# Patient Record
Sex: FEMALE | URBAN - METROPOLITAN AREA
[De-identification: names, ages, dates, MRNs, and addresses within clinical notes are randomized per-mention and may not be internally consistent; named-entity substitution may affect disease eponyms.]

---

## 2020-07-16 ENCOUNTER — LAB REQUISITION (OUTPATIENT)
Dept: LAB | Facility: HOSPITAL | Age: 35
End: 2020-07-16
Payer: COMMERCIAL

## 2020-07-16 DIAGNOSIS — Z01.419 ENCOUNTER FOR GYNECOLOGICAL EXAMINATION (GENERAL) (ROUTINE) WITHOUT ABNORMAL FINDINGS: ICD-10-CM

## 2020-07-16 DIAGNOSIS — Z11.51 ENCOUNTER FOR SCREENING FOR HUMAN PAPILLOMAVIRUS (HPV): ICD-10-CM

## 2020-07-16 PROCEDURE — 88175 CYTOPATH C/V AUTO FLUID REDO: CPT | Performed by: OBSTETRICS & GYNECOLOGY

## 2020-07-16 PROCEDURE — 87624 HPV HI-RISK TYP POOLED RSLT: CPT | Performed by: OBSTETRICS & GYNECOLOGY

## 2020-07-17 LAB — HPV I/H RISK 1 DNA SPEC QL NAA+PROBE: NEGATIVE

## 2025-01-10 ENCOUNTER — APPOINTMENT (OUTPATIENT)
Dept: GENERAL RADIOLOGY | Facility: HOSPITAL | Age: 40
End: 2025-01-10
Attending: EMERGENCY MEDICINE
Payer: COMMERCIAL

## 2025-01-10 ENCOUNTER — HOSPITAL ENCOUNTER (EMERGENCY)
Facility: HOSPITAL | Age: 40
Discharge: HOME | End: 2025-01-10
Attending: EMERGENCY MEDICINE
Payer: COMMERCIAL

## 2025-01-10 VITALS
HEIGHT: 62 IN | WEIGHT: 160 LBS | SYSTOLIC BLOOD PRESSURE: 157 MMHG | RESPIRATION RATE: 14 BRPM | DIASTOLIC BLOOD PRESSURE: 103 MMHG | OXYGEN SATURATION: 100 % | TEMPERATURE: 97 F | BODY MASS INDEX: 29.44 KG/M2 | HEART RATE: 98 BPM

## 2025-01-10 DIAGNOSIS — R00.2 PALPITATIONS: Primary | ICD-10-CM

## 2025-01-10 DIAGNOSIS — R03.0 ELEVATED BLOOD PRESSURE READING: ICD-10-CM

## 2025-01-10 LAB
ANION GAP SERPL CALC-SCNC: 11 MMOL/L (ref 0–18)
ATRIAL RATE: 118 BPM
ATRIAL RATE: 94 BPM
BASOPHILS # BLD AUTO: 0.03 X10(3) UL (ref 0–0.2)
BASOPHILS NFR BLD AUTO: 0.3 %
BUN BLD-MCNC: 21 MG/DL (ref 9–23)
BUN/CREAT SERPL: 23.6 (ref 10–20)
CALCIUM BLD-MCNC: 10 MG/DL (ref 8.7–10.4)
CHLORIDE SERPL-SCNC: 105 MMOL/L (ref 98–112)
CO2 SERPL-SCNC: 26 MMOL/L (ref 21–32)
CREAT BLD-MCNC: 0.89 MG/DL
D DIMER PPP FEU-MCNC: <0.27 UG/ML FEU (ref ?–0.5)
DEPRECATED RDW RBC AUTO: 40.9 FL (ref 35.1–46.3)
EGFRCR SERPLBLD CKD-EPI 2021: 84 ML/MIN/1.73M2 (ref 60–?)
EOSINOPHIL # BLD AUTO: 0.07 X10(3) UL (ref 0–0.7)
EOSINOPHIL NFR BLD AUTO: 0.8 %
ERYTHROCYTE [DISTWIDTH] IN BLOOD BY AUTOMATED COUNT: 11.9 % (ref 11–15)
GLUCOSE BLD-MCNC: 115 MG/DL (ref 70–99)
HCG SERPL QL: NEGATIVE
HCT VFR BLD AUTO: 43.2 %
HGB BLD-MCNC: 14.4 G/DL
IMM GRANULOCYTES # BLD AUTO: 0.01 X10(3) UL (ref 0–1)
IMM GRANULOCYTES NFR BLD: 0.1 %
LYMPHOCYTES # BLD AUTO: 1.79 X10(3) UL (ref 1–4)
LYMPHOCYTES NFR BLD AUTO: 20.2 %
MAGNESIUM SERPL-MCNC: 1.9 MG/DL (ref 1.6–2.6)
MCH RBC QN AUTO: 30.9 PG (ref 26–34)
MCHC RBC AUTO-ENTMCNC: 33.3 G/DL (ref 31–37)
MCV RBC AUTO: 92.7 FL
MONOCYTES # BLD AUTO: 0.38 X10(3) UL (ref 0.1–1)
MONOCYTES NFR BLD AUTO: 4.3 %
NEUTROPHILS # BLD AUTO: 6.58 X10 (3) UL (ref 1.5–7.7)
NEUTROPHILS # BLD AUTO: 6.58 X10(3) UL (ref 1.5–7.7)
NEUTROPHILS NFR BLD AUTO: 74.3 %
NT-PROBNP SERPL-MCNC: <35 PG/ML (ref ?–125)
OSMOLALITY SERPL CALC.SUM OF ELEC: 298 MOSM/KG (ref 275–295)
P AXIS: 49 DEGREES
P AXIS: 53 DEGREES
P-R INTERVAL: 172 MS
P-R INTERVAL: 172 MS
PLATELET # BLD AUTO: 337 10(3)UL (ref 150–450)
POTASSIUM SERPL-SCNC: 3.6 MMOL/L (ref 3.5–5.1)
Q-T INTERVAL: 326 MS
Q-T INTERVAL: 382 MS
QRS DURATION: 104 MS
QRS DURATION: 98 MS
QTC CALCULATION (BEZET): 456 MS
QTC CALCULATION (BEZET): 477 MS
R AXIS: -15 DEGREES
R AXIS: -19 DEGREES
RBC # BLD AUTO: 4.66 X10(6)UL
SODIUM SERPL-SCNC: 142 MMOL/L (ref 136–145)
T AXIS: 26 DEGREES
T AXIS: 29 DEGREES
T3FREE SERPL-MCNC: 3.34 PG/ML (ref 2.4–4.2)
T4 FREE SERPL-MCNC: 1.3 NG/DL (ref 0.8–1.7)
TROPONIN I SERPL HS-MCNC: <3 NG/L
TSI SER-ACNC: 0.34 UIU/ML (ref 0.55–4.78)
VENTRICULAR RATE: 118 BPM
VENTRICULAR RATE: 94 BPM
WBC # BLD AUTO: 8.9 X10(3) UL (ref 4–11)

## 2025-01-10 PROCEDURE — 84481 FREE ASSAY (FT-3): CPT | Performed by: EMERGENCY MEDICINE

## 2025-01-10 PROCEDURE — 99285 EMERGENCY DEPT VISIT HI MDM: CPT

## 2025-01-10 PROCEDURE — 83880 ASSAY OF NATRIURETIC PEPTIDE: CPT | Performed by: EMERGENCY MEDICINE

## 2025-01-10 PROCEDURE — 85379 FIBRIN DEGRADATION QUANT: CPT | Performed by: EMERGENCY MEDICINE

## 2025-01-10 PROCEDURE — 93010 ELECTROCARDIOGRAM REPORT: CPT

## 2025-01-10 PROCEDURE — 96361 HYDRATE IV INFUSION ADD-ON: CPT

## 2025-01-10 PROCEDURE — 84439 ASSAY OF FREE THYROXINE: CPT | Performed by: EMERGENCY MEDICINE

## 2025-01-10 PROCEDURE — 85025 COMPLETE CBC W/AUTO DIFF WBC: CPT | Performed by: EMERGENCY MEDICINE

## 2025-01-10 PROCEDURE — 84443 ASSAY THYROID STIM HORMONE: CPT | Performed by: EMERGENCY MEDICINE

## 2025-01-10 PROCEDURE — 71045 X-RAY EXAM CHEST 1 VIEW: CPT | Performed by: EMERGENCY MEDICINE

## 2025-01-10 PROCEDURE — 96360 HYDRATION IV INFUSION INIT: CPT

## 2025-01-10 PROCEDURE — 84703 CHORIONIC GONADOTROPIN ASSAY: CPT | Performed by: EMERGENCY MEDICINE

## 2025-01-10 PROCEDURE — 93005 ELECTROCARDIOGRAM TRACING: CPT

## 2025-01-10 PROCEDURE — 84484 ASSAY OF TROPONIN QUANT: CPT | Performed by: EMERGENCY MEDICINE

## 2025-01-10 PROCEDURE — 83735 ASSAY OF MAGNESIUM: CPT | Performed by: EMERGENCY MEDICINE

## 2025-01-10 PROCEDURE — 80048 BASIC METABOLIC PNL TOTAL CA: CPT | Performed by: EMERGENCY MEDICINE

## 2025-01-10 NOTE — ED PROVIDER NOTES
[place] Emergency Department Note  Patient: Rosa Fnotana Age: 39 year old Sex: female      MRN: [id number]  : 1985    Patient Seen in: [facility] Emergency Department    History     Chief Complaint   Patient presents with    Arrythmia/Palpitations     Stated Complaint: increased heart rate    History obtained from: patient     39 year old female no significant Pmhx presents to the ED for palpitations. Pt states last night she woke up from a dream and noticed her heart was racing. States she took her HR and was in the 140s at home. States her heart has been intermittently racing since then with palpitations. Denies chest pain or shortness of breath. Denies lightheadedness. No fever or cough, no n/v/d, no abdominal pain, leg swelling or calf pain. Not on OCPs. Recently had outpatient chin liposuction two weeks ago locally. No hx of dvt/pe. No recent travel or hospitalization overnight. No family hx of sudden death. No alcohol/drug use. Has been applying topical castor oil to her chin, no other supplementation.     Review of Systems:  Review of Systems  Positive for stated complaint: increased heart rate. Constitutional and vital signs reviewed. All other systems reviewed and negative except as noted above.    Patient History:  History reviewed. No pertinent past medical history.    Past Surgical History:   Procedure Laterality Date          Other surgical history      breast augmentation    Tubal ligation          Family History   Problem Relation Age of Onset    Cancer Neg        Specific Social Determinants of Health:   Social History     Socioeconomic History    Marital status:    Occupational History    Occupation:    Tobacco Use    Smoking status: Never    Smokeless tobacco: Never   Vaping Use    Vaping status: Never Used   Substance and Sexual Activity    Alcohol use: Yes    Drug use: No    Sexual activity: Yes     Birth control/protection: Tubal Ligation    Other Topics Concern    Exercise Yes     Comment: cardio     Social Drivers of Health      Received from [facility]           PSFH elements reviewed from today and agreed except as otherwise stated in HPI.    Physical Exam     ED Triage Vitals [01/10/25 0230]   BP (!) 160/94   Pulse (!) 145   Resp 16   Temp 97 °F (36.1 °C)   Temp src Temporal   SpO2 100 %   O2 Device None (Room air)       Current:BP (!) 157/103   Pulse 98   Temp 97 °F (36.1 °C) (Temporal)   Resp 14   Ht 157.5 cm (5' 2\")   Wt 72.6 kg   LMP 12/25/2024   SpO2 100%   BMI 29.26 kg/m²         Physical Exam  Vitals and nursing note reviewed.   Constitutional:       General: She is not in acute distress.     Appearance: She is not ill-appearing.   HENT:      Head: Normocephalic and atraumatic.      Right Ear: External ear normal.      Left Ear: External ear normal.      Nose: Nose normal.      Mouth/Throat:      Mouth: Mucous membranes are moist.   Eyes:      Conjunctiva/sclera: Conjunctivae normal.   Cardiovascular:      Rate and Rhythm: Regular rhythm. Tachycardia present.      Heart sounds: No murmur heard.     Comments: +2 radial and dp pulses bilaterally   Pulmonary:      Effort: Pulmonary effort is normal. No respiratory distress.      Breath sounds: No wheezing or rales.   Abdominal:      General: There is no distension.      Palpations: Abdomen is soft.      Tenderness: There is no abdominal tenderness. There is no guarding or rebound.   Musculoskeletal:         General: No deformity.      Right lower leg: No edema.      Left lower leg: No edema.   Skin:     General: Skin is warm and dry.      Capillary Refill: Capillary refill takes less than 2 seconds.   Neurological:      General: No focal deficit present.      Mental Status: She is alert.         ED Course   Labs:   Labs Reviewed   BASIC METABOLIC PANEL (8) - Abnormal; Notable for the following components:       Result Value    Glucose 115 (*)     BUN/CREA Ratio 23.6  (*)     Calculated Osmolality 298 (*)     All other components within normal limits   TSH W REFLEX TO FREE T4 - Abnormal; Notable for the following components:    TSH 0.338 (*)     All other components within normal limits   TROPONIN I HIGH SENSITIVITY - Normal   HCG, BETA SUBUNIT, QUAL - Normal   PRO BETA NATRIURETIC PEPTIDE - Normal   D-DIMER - Normal   MAGNESIUM - Normal   T4, FREE (S) - Normal   FREE T3 (TRIIODOTHYRONINE) - Normal   CBC WITH DIFFERENTIAL WITH PLATELET     Radiology findings:    XR CHEST AP PORTABLE  (CPT=71045)    Result Date: 1/10/2025  CONCLUSION: No acute cardiopulmonary abnormality.  [facility] provided a preliminary report for this examination. This final report agrees with their preliminary findings.    Dictated by (CST): Sreekanth Aparicio MD on 1/10/2025 at 6:22 AM     Finalized by (CST): Sreekanth Aparicio MD on 1/10/2025 at 6:22 AM           EKG as interpreted by me: Sinus tachycardia rate 118 beats minute, left axis deviation, possible voltage criteria for LVH in lead aVL, no STEMI, QTc 456 ms.  No prior.  Cardiac Monitor: Interpreted by me.   Pulse Readings from Last 1 Encounters:   01/10/25 98   , sinus,       MDM   This patient presents with palpitations onset last night, no CP or Sob. On arrival to triage HR was 140s however on my assessment HR <100. Pt appears well perfused. She is hypertensive.     Differential diagnoses considered includes, but is not limited to:   Electrolyte abnormality  Anemia  Hyperthyroidism  Low suspicion PE but cannot exclude by PERC rule given tachycardia  Low suspicion myocarditis/pericarditis or CHF     Will obtain the following tests: cbc, bmp, trop, hcg, mg, d-dimer, TSH, probnp, cxr, serial ecg   Will administer the following medications/therapies: IV NS bolus     Please see ED course for my independent review of these tests/imaging results.        ED Course as of 01/10/25 0958  ------------------------------------------------------------  Time: 01/10  2106  Comment: My interpretation of repeat EKG shows sinus rhythm with sinus arrhythmia rate 94 beats minute, there is left axis deviation, QTc 477 ms, minimal voltage criteria for LVH, there is no Brugada pattern, no delta with, no ectopy  ------------------------------------------------------------  Time: 01/10 0514  Comment: Cbc, trop, probnp, mg, bmp unremarkable.   ------------------------------------------------------------  Time: 01/10 0517  Comment: My independent interpretation patient's chest x-ray, no cardiomegaly, no pleural effusion, no focal lung consolidation.  ------------------------------------------------------------  Time: 01/10 0517  Comment:   XR CHEST at 0411 hrs    COMPARISON: None    IMPRESSION:    Lungs are clear without confluent consolidation. No pleural effusion or pneumothorax. Heart size is normal. Mediastinal contour is normal.    ------------------------------------------------------------  Time: 01/10 0601  Comment: D-dimer undetectable. Pt reevaluated. Updated with results. Will send TSH, though if T4/3 neg no indication for treatment. Advised close f/u with PMD and return if new/worsening sx. Advised particularly if continues to have any palpitations establish with cardiology within 7d as she may require Holter monitoring or other management. Pt feels comfortable with the DC plan at this time. Advised outpatient BP recheck as well. Pt stable for DC at this time.             Procedures:  Procedures      Disposition and Plan     Clinical Impression:  1. Palpitations    2. Elevated blood pressure reading        Disposition:  Discharge    Follow-up:  Corby Dyson  [address] [facility] [address] [phone]    Schedule an appointment as soon as possible for a visit in 2 day(s)      [facility] Emergency Department  [address]  [facility] [id number] [phone]  Go to  If symptoms worsen, immediately    Anton Casey MD  [address]  [place]  [address]  [facility] [address]  [phone]    Schedule an appointment as soon as possible for a visit in 1 week(s)  particularly if palpitations are continuing      Medications Prescribed:  Discharge Medication List as of 1/10/2025  6:08 AM            This note may have been created using voice dictation technology and may include inadvertent errors.      Meagan Osorio, DO  Attending Physician   Emergency Medicine

## 2025-01-10 NOTE — DISCHARGE INSTRUCTIONS
Thank you for seeking care at [facility] Emergency Department.  You have been seen and evaluated for palpitations and fast heart rate.    We discussed the results of your workup   Please read the instructions provided   If given prescriptions, take as instructed    Remember, your care process does not end after your visit today. Please follow-up with your doctor within 1-2 days for a follow-up check to ensure you are  improving, to see if you need any further evaluation/testing, or to evaluate for any alternate diagnoses.     Please return to the emergency department if you develop chest pain, difficulty breathing, inability to drink liquids without vomiting, one sided numbness or weakness, slurred speech, severe headache, or if you develop any other new or concerning symptoms as these could be signs of more serious medical illness.    We hope you feel better.